# Patient Record
Sex: FEMALE | Race: WHITE | NOT HISPANIC OR LATINO | Employment: FULL TIME | RURAL
[De-identification: names, ages, dates, MRNs, and addresses within clinical notes are randomized per-mention and may not be internally consistent; named-entity substitution may affect disease eponyms.]

---

## 2023-05-01 ENCOUNTER — TELEPHONE (OUTPATIENT)
Dept: FAMILY MEDICINE CLINIC | Facility: CLINIC | Age: 43
End: 2023-05-01

## 2023-05-02 RX ORDER — ESCITALOPRAM OXALATE 10 MG/1
10 TABLET ORAL DAILY
Qty: 30 TABLET | Refills: 0 | Status: SHIPPED | OUTPATIENT
Start: 2023-05-02 | End: 2023-05-25 | Stop reason: SDUPTHER

## 2023-05-02 RX ORDER — ESCITALOPRAM OXALATE 10 MG/1
TABLET ORAL
COMMUNITY
Start: 2023-01-22 | End: 2023-05-02 | Stop reason: SDUPTHER

## 2023-05-17 ENCOUNTER — OFFICE VISIT (OUTPATIENT)
Dept: OBSTETRICS AND GYNECOLOGY | Facility: CLINIC | Age: 43
End: 2023-05-17
Payer: COMMERCIAL

## 2023-05-17 VITALS
BODY MASS INDEX: 31.8 KG/M2 | SYSTOLIC BLOOD PRESSURE: 110 MMHG | WEIGHT: 209.8 LBS | HEIGHT: 68 IN | DIASTOLIC BLOOD PRESSURE: 80 MMHG

## 2023-05-17 DIAGNOSIS — N76.0 ACUTE VAGINITIS: ICD-10-CM

## 2023-05-17 DIAGNOSIS — N60.19 FIBROCYSTIC BREAST DISEASE (FCBD), UNSPECIFIED LATERALITY: ICD-10-CM

## 2023-05-17 DIAGNOSIS — R92.2 BREAST DENSITY: ICD-10-CM

## 2023-05-17 DIAGNOSIS — Z01.411 ENCOUNTER FOR GYNECOLOGICAL EXAMINATION WITH ABNORMAL FINDING: Primary | ICD-10-CM

## 2023-05-17 RX ORDER — MULTIPLE VITAMINS W/ MINERALS TAB 9MG-400MCG
1 TAB ORAL DAILY
COMMUNITY

## 2023-05-17 NOTE — PROGRESS NOTES
"Chief Complaint  Martha Suarez is a 43 y.o.  female presenting for Gynecologic Exam (Former  patient.  Annual exam.  )    History of Present Illness  Martha is a very pleasant 44yo woman, , a delightful pt of mine, previously at [facility].  She has had BTL.  Her menses are still normal and regular monthly.  She has no gyn c/o's.  She is due for pap smear & mammogram.  She does have breast density noted on previous mammograms and has been getting the contrast enhanced mammograms.  (Last in )  She is good about doing reg SBE.  She also noted the FBD changes in right breast, and agrees with me that it seems worse in right breast than left.  Otherwise, ROS neg.    The following portions of the patient's history were reviewed and updated as appropriate: allergies, current medications, past family history, past medical history, past social history, past surgical history and problem list.    Allergies   Allergen Reactions   • Codeine Rash and Swelling         Current Outpatient Medications:   •  escitalopram (LEXAPRO) 10 MG tablet, Take 1 tablet by mouth Daily., Disp: 30 tablet, Rfl: 0  •  multivitamin with minerals (MULTIVITAMIN ADULT PO), Take 1 tablet by mouth Daily., Disp: , Rfl:     Past Medical History:   Diagnosis Date   • Abnormal Pap smear of cervix    • Anxiety         Past Surgical History:   Procedure Laterality Date   • GASTRIC SLEEVE LAPAROSCOPIC     • TUBAL ABDOMINAL LIGATION         Objective  /80   Ht 172.7 cm (68\")   Wt 95.2 kg (209 lb 12.8 oz)   LMP 2023 (Exact Date)   Breastfeeding No   BMI 31.90 kg/m²     Physical Exam  Vitals and nursing note reviewed. Exam conducted with a chaperone present.   Constitutional:       General: She is not in acute distress.     Appearance: Normal appearance. She is not ill-appearing.   HENT:      Head: Normocephalic.   Neck:      Thyroid: No thyroid mass or thyromegaly.   Cardiovascular:      Rate and Rhythm: Normal rate and " regular rhythm.      Heart sounds: Normal heart sounds. No murmur heard.  Pulmonary:      Effort: Pulmonary effort is normal. No respiratory distress.      Breath sounds: Normal breath sounds.   Chest:   Breasts:     Right: No inverted nipple, mass or nipple discharge.      Left: No inverted nipple, mass or nipple discharge.      Comments: Right breast with much more FBD changes (overall density with scatted nodularity in the UOQ); Left breast with small amt FBD in UOQ.  Abdominal:      Palpations: Abdomen is soft. There is no mass.      Tenderness: There is no abdominal tenderness.   Genitourinary:     General: Normal vulva.      Labia:         Right: No rash, tenderness or lesion.         Left: No rash, tenderness or lesion.       Vagina: Vaginal discharge and erythema present.      Cervix: No discharge, lesion or erythema.      Uterus: Not enlarged and not tender.       Adnexa:         Right: No mass or tenderness.          Left: No mass or tenderness.        Comments: Minimal erythema of mucosa, but mod to large amt of rather thin yellowish vaginal discharge.  (OneSwab taken).  Otherwise, exam wnl.  Anus appears wnl.  No rectal exam performed.  Lymphadenopathy:      Upper Body:      Right upper body: No supraclavicular or axillary adenopathy.      Left upper body: No supraclavicular or axillary adenopathy.   Skin:     General: Skin is warm and dry.   Neurological:      Mental Status: She is alert and oriented to person, place, and time.   Psychiatric:         Mood and Affect: Mood normal.         Behavior: Behavior normal.         Assessment/Plan   Diagnoses and all orders for this visit:    1. Encounter for gynecological examination with abnormal finding (Primary)  -     LIQUID-BASED PAP SMEAR WITH HPV GENOTYPING REGARDLESS OF INTERPRETATION (ROSALINO,COR,MAD)    2. Fibrocystic breast disease (FCBD), unspecified laterality    3. Breast density    4. Acute vaginitis  -     OneSwab - Swab, Cervix, Vagina;  Future    MATT & Right Breast US of the UOQ (Cont. At [facility])  Call pt with results of OneSwab & treat then accordingly.      Procedures    40 to 64: Counseling/Anticipatory Guidance Discussed: nutrition, physical activity, screenings and self-breast exam    Return in about 3 months (around 8/17/2023) for Recheck -- follow up clinical breast exam.    Consuelo Prasad, APRN  05/17/2023

## 2023-05-18 LAB — REF LAB TEST METHOD: NORMAL

## 2023-05-19 ENCOUNTER — PATIENT ROUNDING (BHMG ONLY) (OUTPATIENT)
Dept: OBSTETRICS AND GYNECOLOGY | Facility: CLINIC | Age: 43
End: 2023-05-19
Payer: COMMERCIAL

## 2023-05-19 NOTE — PROGRESS NOTES
A [facility] message has been sent to the patient for PATIENT ROUNDING with [facility].

## 2023-05-23 RX ORDER — METRONIDAZOLE 500 MG/1
500 TABLET ORAL 2 TIMES DAILY
Qty: 14 TABLET | Refills: 0 | Status: SHIPPED | OUTPATIENT
Start: 2023-05-23 | End: 2023-05-30

## 2023-05-23 RX ORDER — CLINDAMYCIN PHOSPHATE 20 MG/G
1 CREAM VAGINAL NIGHTLY
Qty: 7 G | Refills: 0 | Status: SHIPPED | OUTPATIENT
Start: 2023-05-23 | End: 2023-05-30

## 2023-05-25 ENCOUNTER — TELEMEDICINE (OUTPATIENT)
Dept: FAMILY MEDICINE CLINIC | Facility: CLINIC | Age: 43
End: 2023-05-25
Payer: COMMERCIAL

## 2023-05-25 DIAGNOSIS — F41.9 ANXIETY: Primary | ICD-10-CM

## 2023-05-25 RX ORDER — ESCITALOPRAM OXALATE 10 MG/1
10 TABLET ORAL DAILY
Qty: 90 TABLET | Refills: 3 | Status: SHIPPED | OUTPATIENT
Start: 2023-05-25

## 2023-05-25 NOTE — PROGRESS NOTES
Mode of Visit: Video  Location of patient: home  You have chosen to receive care through a telehealth visit.  The patient has signed the video visit consent form.  The visit included audio and video interaction. No technical issues occurred during this visit.     Chief Complaint  No chief complaint on file.      Martha Suarez presents to [facility]      Patient seen for refill on her anxiety medication.  Currently takes Lexapro.  Overall condition stable.  No side effects.  She does need refills today.    Review of Systems   Constitutional: Negative for fatigue and fever.   HENT: Negative for congestion and ear pain.    Respiratory: Negative for apnea, cough, chest tightness and shortness of breath.    Cardiovascular: Negative for chest pain.   Gastrointestinal: Negative for abdominal pain, constipation, diarrhea and nausea.   Musculoskeletal: Negative for arthralgias.   Psychiatric/Behavioral: Negative for depressed mood and stress.       Objective   Vital Signs:   There were no vitals taken for this visit.    Physical Exam   Constitutional: She appears well-developed and well-nourished.   Psychiatric: She has a normal mood and affect.       BMI is >= 30 and <35. (Class 1 Obesity). The following options were offered after discussion;: exercise counseling/recommendations and nutrition counseling/recommendations            Assessment and Plan    Diagnoses and all orders for this visit:    1. Anxiety (Primary)    Other orders  -     escitalopram (LEXAPRO) 10 MG tablet; Take 1 tablet by mouth Daily.  Dispense: 90 tablet; Refill: 3        Will refill Lexapro.    Follow Up   No follow-ups on file.  Patient was given instructions and counseling regarding her condition or for health maintenance advice. Please see specific information pulled into the AVS if appropriate.

## 2023-05-30 RX ORDER — ESCITALOPRAM OXALATE 10 MG/1
TABLET ORAL
Qty: 30 TABLET | OUTPATIENT
Start: 2023-05-30

## 2023-08-25 ENCOUNTER — OFFICE VISIT (OUTPATIENT)
Dept: OBSTETRICS AND GYNECOLOGY | Facility: CLINIC | Age: 43
End: 2023-08-25
Payer: COMMERCIAL

## 2023-08-25 VITALS
SYSTOLIC BLOOD PRESSURE: 116 MMHG | BODY MASS INDEX: 32.8 KG/M2 | RESPIRATION RATE: 14 BRPM | WEIGHT: 216.4 LBS | HEIGHT: 68 IN | DIASTOLIC BLOOD PRESSURE: 68 MMHG

## 2023-08-25 DIAGNOSIS — N60.19 FIBROCYSTIC BREAST DISEASE (FCBD), UNSPECIFIED LATERALITY: Primary | ICD-10-CM

## 2023-08-25 DIAGNOSIS — D24.1 FIBROADENOMA OF RIGHT BREAST: ICD-10-CM

## 2023-08-25 DIAGNOSIS — R92.2 BREAST DENSITY: ICD-10-CM

## 2023-08-25 NOTE — PROGRESS NOTES
"Chief Complaint  Martha Suarez is a 43 y.o.  female presenting for Follow-up (3 mos breast check, no complaints)    History of Present Illness  Martha is a very pleasant 44yo woman, , here for 3 mo clinical breast exam (to ensure stability).  She has FBD and extremely dense breasts on mammography.  She underwent an US guided Bx in 2023; pathology: fibroadenoma.  May now return to annual mammograms.  She is good about doing SBE faithfully q month.    The vaginitis we treated at [facility] has resolved, and she hasn't had any recurrence.    Otherwise, ROS negative.      The following portions of the patient's history were reviewed and updated as appropriate: allergies, current medications, past family history, past medical history, past social history, past surgical history, and problem list.    Allergies   Allergen Reactions    Codeine Rash and Swelling         Current Outpatient Medications:     escitalopram (LEXAPRO) 10 MG tablet, Take 1 tablet by mouth Daily., Disp: 90 tablet, Rfl: 3    multivitamin with minerals tablet tablet, Take 1 tablet by mouth Daily., Disp: , Rfl:     Past Medical History:   Diagnosis Date    Abnormal Pap smear of cervix     Anxiety         Past Surgical History:   Procedure Laterality Date    GASTRIC SLEEVE LAPAROSCOPIC      MAMMO US BREAST BIOPSY 1ST W WO DEVICE UNILATERAL Right 2023    path - fibroadenoma    TUBAL ABDOMINAL LIGATION         Objective  /68 (BP Location: Right arm, Patient Position: Sitting, Cuff Size: Adult)   Resp 14   Ht 172.7 cm (68\")   Wt 98.2 kg (216 lb 6.4 oz)   LMP 08/15/2023 (Approximate)   Breastfeeding No   BMI 32.90 kg/mý     Physical Exam  Vitals and nursing note reviewed.   Constitutional:       Appearance: Normal appearance.   Chest:   Breasts:     Right: No inverted nipple, nipple discharge or skin change.      Left: No inverted nipple, nipple discharge or skin change.      Comments: Stable exam with fibrous and cystic changes " bilat.  Dense breasts with lots of scattered nodularity.  The right breast still has more of the fibrous/ dense tissue and scattered nodularity in the UOQ than the left breast.    Lymphadenopathy:      Upper Body:      Right upper body: No supraclavicular or axillary adenopathy.      Left upper body: No supraclavicular or axillary adenopathy.   Neurological:      Mental Status: She is alert.       Assessment/Plan   Diagnoses and all orders for this visit:    1. Fibrocystic breast disease (FCBD), unspecified laterality (Primary)    2. Breast density    3. Fibroadenoma of right breast    Couns re: SBE & continuing annual mammograms.  If lifetime risk ever > 20%, will add MRI annually.  Adv to promptly call us with new findings or anything that is worrisome on exam.    Procedures    40 to 64: Counseling/Anticipatory Guidance Discussed: screenings and self-breast exam    Return for Annual physical.    Consuelo Prasad, APRN  08/25/2023

## 2024-06-18 RX ORDER — ESCITALOPRAM OXALATE 10 MG/1
10 TABLET ORAL DAILY
Qty: 90 TABLET | Refills: 0 | Status: SHIPPED | OUTPATIENT
Start: 2024-06-18

## 2024-08-28 ENCOUNTER — OFFICE VISIT (OUTPATIENT)
Dept: OBSTETRICS AND GYNECOLOGY | Facility: CLINIC | Age: 44
End: 2024-08-28
Payer: COMMERCIAL

## 2024-08-28 ENCOUNTER — LAB (OUTPATIENT)
Dept: LAB | Facility: HOSPITAL | Age: 44
End: 2024-08-28
Payer: COMMERCIAL

## 2024-08-28 VITALS
WEIGHT: 219.6 LBS | HEIGHT: 68 IN | DIASTOLIC BLOOD PRESSURE: 72 MMHG | BODY MASS INDEX: 33.28 KG/M2 | SYSTOLIC BLOOD PRESSURE: 118 MMHG

## 2024-08-28 DIAGNOSIS — Z01.411 ENCOUNTER FOR GYNECOLOGICAL EXAMINATION WITH ABNORMAL FINDING: Primary | ICD-10-CM

## 2024-08-28 DIAGNOSIS — R10.2 PELVIC PAIN: ICD-10-CM

## 2024-08-28 DIAGNOSIS — B35.4 TINEA CORPORIS: ICD-10-CM

## 2024-08-28 DIAGNOSIS — N91.2 AMENORRHEA: ICD-10-CM

## 2024-08-28 DIAGNOSIS — N89.8 VAGINAL DISCHARGE: ICD-10-CM

## 2024-08-28 LAB
ESTRADIOL SERPL HS-MCNC: 123 PG/ML
FSH SERPL-ACNC: 3.41 MIU/ML
LH SERPL-ACNC: 1.7 MIU/ML
TSH SERPL DL<=0.05 MIU/L-ACNC: 2.83 UIU/ML (ref 0.27–4.2)

## 2024-08-28 PROCEDURE — 82670 ASSAY OF TOTAL ESTRADIOL: CPT

## 2024-08-28 PROCEDURE — 84443 ASSAY THYROID STIM HORMONE: CPT

## 2024-08-28 PROCEDURE — 84702 CHORIONIC GONADOTROPIN TEST: CPT

## 2024-08-28 PROCEDURE — 36415 COLL VENOUS BLD VENIPUNCTURE: CPT

## 2024-08-28 PROCEDURE — 83002 ASSAY OF GONADOTROPIN (LH): CPT

## 2024-08-28 PROCEDURE — 83001 ASSAY OF GONADOTROPIN (FSH): CPT

## 2024-08-28 RX ORDER — NYSTATIN 100000 U/G
1 OINTMENT TOPICAL 2 TIMES DAILY
Qty: 30 G | Refills: 3 | Status: SHIPPED | OUTPATIENT
Start: 2024-08-28

## 2024-08-28 NOTE — PROGRESS NOTES
"Chief Complaint  Martha Suarez is a 44 y.o.  female presenting for Annual Exam (Patient with history of ovarian cysts.  Would like to discuss management today. ) and Menstrual Problem (LMP 24.  )    History of Present Illness  Martha is a very pleasant 45yo woman, , here for annual gyn exam.  Her past surgical history includes bilateral tubal sterilization, a right breast bx (fibroadenoma), and gastric sleeve.  She does experience pain at mid-cycle, and we discussed ovulation pain.   It is rel by OTC meds.  Does not interfere with ADL.   She has amenorrhea in August.  No VMS.  She gets contrast enhanced mammograms at [facility] because of the density.  Has a pruritus rash between her breasts.      The following portions of the patient's history were reviewed and updated as appropriate: allergies, current medications, past family history, past medical history, past social history, past surgical history, and problem list.    Allergies   Allergen Reactions    Codeine Rash and Swelling         Current Outpatient Medications:     escitalopram (LEXAPRO) 10 MG tablet, Take 1 tablet by mouth Daily., Disp: 90 tablet, Rfl: 0    multivitamin with minerals tablet tablet, Take 1 tablet by mouth Daily., Disp: , Rfl:     nystatin (MYCOSTATIN) 867813 UNIT/GM ointment, Apply 1 Application topically to the appropriate area as directed 2 (Two) Times a Day., Disp: 30 g, Rfl: 3    Past Medical History:   Diagnosis Date    Abnormal Pap smear of cervix     Anxiety         Past Surgical History:   Procedure Laterality Date    GASTRIC SLEEVE LAPAROSCOPIC      MAMMO US BREAST BIOPSY 1ST W WO DEVICE UNILATERAL Right 2023    path - fibroadenoma    TUBAL ABDOMINAL LIGATION         Objective  /72   Ht 172.7 cm (68\")   Wt 99.6 kg (219 lb 9.6 oz)   LMP 2024 (Exact Date)   Breastfeeding No   BMI 33.39 kg/m²     Physical Exam  Vitals and nursing note reviewed. Exam conducted with a chaperone present. "   Constitutional:       General: She is not in acute distress.     Appearance: Normal appearance. She is not ill-appearing.   HENT:      Head: Normocephalic.   Neck:      Thyroid: No thyroid mass or thyromegaly.   Cardiovascular:      Rate and Rhythm: Normal rate and regular rhythm.      Heart sounds: Normal heart sounds. No murmur heard.  Pulmonary:      Effort: Pulmonary effort is normal. No respiratory distress.      Breath sounds: Normal breath sounds.   Chest:   Breasts:     Right: No inverted nipple, mass or nipple discharge.      Left: No inverted nipple, mass or nipple discharge.   Abdominal:      Palpations: Abdomen is soft. There is no mass.      Tenderness: There is no abdominal tenderness.   Genitourinary:     General: Normal vulva.      Labia:         Right: No rash, tenderness or lesion.         Left: No rash, tenderness or lesion.       Vagina: Normal. Vaginal discharge present. No erythema.      Cervix: No discharge, lesion or erythema.      Uterus: Not enlarged and not tender.       Adnexa:         Right: No mass or tenderness.          Left: No mass or tenderness.        Comments: Anus appears wnl.  No rectal exam performed.  Lymphadenopathy:      Upper Body:      Right upper body: No supraclavicular or axillary adenopathy.      Left upper body: No supraclavicular or axillary adenopathy.   Skin:     General: Skin is warm and dry.      Findings: Rash present.      Comments: Fine maculopapular erythematous rash between breasts (tinea appearance).   Neurological:      Mental Status: She is alert and oriented to person, place, and time.   Psychiatric:         Mood and Affect: Mood normal.         Behavior: Behavior normal.         Assessment/Plan   Diagnoses and all orders for this visit:    1. Encounter for gynecological examination with abnormal finding (Primary)    2. Pelvic pain  -     US Non-ob Transvaginal; Future  -     Follicle Stimulating Hormone; Future  -     Luteinizing Hormone; Future  -      Estradiol; Future  -     TSH; Future  -     HCG, B-subunit, Quantitative; Future    3. Amenorrhea  -     US Non-ob Transvaginal; Future  -     Follicle Stimulating Hormone; Future  -     Luteinizing Hormone; Future  -     Estradiol; Future  -     TSH; Future  -     HCG, B-subunit, Quantitative; Future    4. Tinea corporis  -     nystatin (MYCOSTATIN) [id number] UNIT/GM ointment; Apply 1 Application topically to the appropriate area as directed 2 (Two) Times a Day.  Dispense: 30 g; Refill: 3    5. Vaginal discharge  -     OneSwab - Swab, Vagina; Future    We discussed mittelschmerz & OTC meds.  Will rule out anomalies in the pelvis, but normal bimanual exam.    This dear pt was informed of my plans for nursing home in April 2025.  She will see Dr. Matthews in the future.    Procedures    40 to 64: Counseling/Anticipatory Guidance Discussed: screenings and self-breast exam    Return in about 1 year (around 8/28/2025) for Annual physical.    Consuelo Prasad, APRTAY  08/28/2024

## 2024-08-29 LAB — HCG INTACT+B SERPL-ACNC: <1 MIU/ML

## 2024-08-30 RX ORDER — MEDROXYPROGESTERONE ACETATE 10 MG
10 TABLET ORAL DAILY
Qty: 10 TABLET | Refills: 0 | Status: SHIPPED | OUTPATIENT
Start: 2024-08-30 | End: 2024-09-09

## 2024-09-03 ENCOUNTER — OFFICE VISIT (OUTPATIENT)
Dept: FAMILY MEDICINE CLINIC | Facility: CLINIC | Age: 44
End: 2024-09-03
Payer: COMMERCIAL

## 2024-09-03 VITALS
BODY MASS INDEX: 33.04 KG/M2 | DIASTOLIC BLOOD PRESSURE: 60 MMHG | SYSTOLIC BLOOD PRESSURE: 110 MMHG | WEIGHT: 218 LBS | HEART RATE: 76 BPM | OXYGEN SATURATION: 98 % | HEIGHT: 68 IN

## 2024-09-03 DIAGNOSIS — Z00.00 ROUTINE GENERAL MEDICAL EXAMINATION AT A HEALTH CARE FACILITY: Primary | ICD-10-CM

## 2024-09-03 PROCEDURE — 99396 PREV VISIT EST AGE 40-64: CPT | Performed by: FAMILY MEDICINE

## 2024-09-03 RX ORDER — METRONIDAZOLE 500 MG/1
500 TABLET ORAL 2 TIMES DAILY
Qty: 14 TABLET | Refills: 0 | Status: SHIPPED | OUTPATIENT
Start: 2024-09-03 | End: 2024-09-10

## 2024-09-03 RX ORDER — ESCITALOPRAM OXALATE 10 MG/1
10 TABLET ORAL DAILY
Qty: 90 TABLET | Refills: 3 | Status: SHIPPED | OUTPATIENT
Start: 2024-09-03

## 2024-09-03 NOTE — PROGRESS NOTES
Female Physical Note      Date: 2024   Patient Name: Martha Suarez  : 1980   MRN: [id number]     Chief Complaint:    Chief Complaint   Patient presents with    Annual Exam       History of Present Illness: Martha Suarez is a 44 y.o. female who is here today for their annual health maintenance and physical.   History of Present Illness  The patient is a 44-year-old female here for her annual physical.    She reports overall good health. Last week, she underwent hormonal and thyroid tests at [facility].    She has been prescribed Lexapro, which is due for a refill in 2024.    A mammogram has been ordered for her this year, but she has yet to schedule the appointment.    She expresses interest in Ozempic, a medication used by her  for diabetes management. She has a diagnosis of Polycystic Ovary Syndrome (PCOS) and has experienced weight gain even with maintaining a healthy diet and regular exercise. She is considering Ozempic as a potential treatment option.    FAMILY HISTORY  Her mother has some cognitive decline. Her father is diabetic.    Subjective      Review of Systems:   Review of Systems   Constitutional:  Negative for fatigue and fever.   HENT:  Negative for congestion and ear pain.    Respiratory:  Negative for apnea, cough, chest tightness and shortness of breath.    Cardiovascular:  Negative for chest pain.   Gastrointestinal:  Negative for abdominal pain, constipation, diarrhea and nausea.   Musculoskeletal:  Negative for arthralgias.   Psychiatric/Behavioral:  Negative for depressed mood and stress.        Past Medical History:   Past Medical History:   Diagnosis Date    Abnormal Pap smear of cervix     Anxiety        Past Surgical History:   Past Surgical History:   Procedure Laterality Date    GASTRIC SLEEVE LAPAROSCOPIC      MAMMO US BREAST BIOPSY 1ST W WO DEVICE UNILATERAL Right 2023    path - fibroadenoma    TUBAL ABDOMINAL LIGATION         Family History:    Family History   Problem Relation Age of Onset    Coronary artery disease Father     Diabetes Father     Leukemia Father     Heart attack Father     Diabetes Mother     Diabetes Maternal Grandfather     Diabetes Maternal Aunt     Parkinsonism Maternal Aunt     Diabetes Maternal Aunt        Social History:   Social History     Socioeconomic History    Marital status:    Tobacco Use    Smoking status: Former     Types: Cigarettes    Smokeless tobacco: Never   Vaping Use    Vaping status: Never Used   Substance and Sexual Activity    Alcohol use: Yes     Alcohol/week: 2.0 - 3.0 standard drinks of alcohol     Types: 2 - 3 Drinks containing 0.5 oz of alcohol per week     Comment: social    Drug use: Never    Sexual activity: Yes     Partners: Male     Birth control/protection: Tubal ligation       Medications:     Current Outpatient Medications:     escitalopram (LEXAPRO) 10 MG tablet, Take 1 tablet by mouth Daily., Disp: 90 tablet, Rfl: 3    medroxyPROGESTERone (Provera) 10 MG tablet, Take 1 tablet by mouth Daily for 10 days., Disp: 10 tablet, Rfl: 0    multivitamin with minerals tablet tablet, Take 1 tablet by mouth Daily., Disp: , Rfl:     nystatin (MYCOSTATIN) 603003 UNIT/GM ointment, Apply 1 Application topically to the appropriate area as directed 2 (Two) Times a Day., Disp: 30 g, Rfl: 3    Tirzepatide-Weight Management (ZEPBOUND) 2.5 MG/0.5ML solution auto-injector, Inject 0.5 mL under the skin into the appropriate area as directed 1 (One) Time Per Week., Disp: 2 mL, Rfl: 1    Allergies:   Allergies   Allergen Reactions    Codeine Rash and Swelling       Immunization History   Administered Date(s) Administered    COVID-19 (MODERNA) 1st,2nd,3rd Dose Monovalent 01/26/2021, 02/23/2021    Hepatitis A 09/21/2018, 03/22/2019    Tdap 08/17/2018      Colorectal Screening:     Last Completed Colonoscopy       This patient has no relevant Health Maintenance data.          Pap:    Last Completed Pap Smear        "     PAP SMEAR (Every 3 Years) Next due on 5/17/2026 05/17/2023  LIQUID-BASED PAP SMEAR WITH HPV GENOTYPING REGARDLESS OF INTERPRETATION (ROSALINO,COR,MAD)                   Mammogram:    Last Completed Mammogram            MAMMOGRAM (Every 2 Years) Next due on 6/27/2025 06/27/2023  SCANNED - MAMMO    06/22/2023  SCANNED - MAMMO    06/22/2023  SCANNED - MAMMO    08/24/2021  SCANNED - MAMMO    12/20/2019  SCANNED - MAMMO    Only the first 5 history entries have been loaded, but more history exists.                     Bone Density/DEXA: .     Diet/Physical activity: Appropriate diet and physical activity discussed.    Depression: PHQ-2 Depression Screening  Little interest or pleasure in doing things? 0-->not at all   Feeling down, depressed, or hopeless? 0-->not at all   PHQ-2 Total Score 0       Objective     Physical Exam:  Vital Signs:   Vitals:    09/03/24 0907   BP: 110/60   Pulse: 76   SpO2: 98%   Weight: 98.9 kg (218 lb)   Height: 172.7 cm (68\")     Body mass index is 33.15 kg/m².     Physical Exam  Vitals and nursing note reviewed.   Constitutional:       General: She is not in acute distress.     Appearance: Normal appearance. She is not ill-appearing.   HENT:      Head: Normocephalic and atraumatic.      Right Ear: Tympanic membrane and ear canal normal.      Left Ear: Tympanic membrane and ear canal normal.      Nose: Nose normal.   Cardiovascular:      Rate and Rhythm: Normal rate and regular rhythm.      Heart sounds: Normal heart sounds.   Pulmonary:      Effort: Pulmonary effort is normal.      Breath sounds: Normal breath sounds.   Neurological:      Mental Status: She is alert and oriented to person, place, and time. Mental status is at baseline.   Psychiatric:         Mood and Affect: Mood normal.       Physical Exam  Vital Signs  Blood pressure reading is 110/60.    Procedures  Results  Laboratory Studies  Thyroid test was good.    Assessment / Plan      Assessment/Plan:   Diagnoses and all " orders for this visit:    1. Routine general medical examination at a health care facility (Primary)  -     CBC & Differential  -     Comprehensive Metabolic Panel  -     Lipid Panel    Other orders  -     escitalopram (LEXAPRO) 10 MG tablet; Take 1 tablet by mouth Daily.  Dispense: 90 tablet; Refill: 3  -     Tirzepatide-Weight Management (ZEPBOUND) 2.5 MG/0.5ML solution auto-injector; Inject 0.5 mL under the skin into the appropriate area as directed 1 (One) Time Per Week.  Dispense: 2 mL; Refill: 1       Assessment & Plan  1. Annual Physical.  Her blood pressure is 110/60 mmHg. Thyroid function tests from last week at [facility] are within normal limits. Routine blood work, including complete blood count, kidney function tests, liver function tests, glucose levels, and cholesterol panel, will be ordered. Results will be reviewed through [facility], and any issues will be communicated directly.    2. Medication Management.  Her Lexapro prescription will  in 2024. A refill for Lexapro 10 mg will be provided for 90 days at a time, sent to [facility].    3. Polycystic Ovary Syndrome (PCOS).  Obesity.  She has experienced weight gain despite maintaining a healthy diet and regular exercise. A prescription for Wegovy will be sent to the pharmacy, pending insurance approval. She is advised to contact human resources to understand the insurance process for weight management medications. If approved, she will start at a lower dose and gradually increase it to avoid nausea. She may need to use a fiber supplement or stool softener to manage potential constipation. Follow-up will be done through [facility] to adjust the dosage as needed.    4. Health Maintenance.  She received a tetanus vaccine in  and is not due for another until . She underwent a Pap smear last year and is not due for another until . She is not due for colon cancer screening until age 47. A mammogram has  been ordered and she will schedule the appointment at [facility].          Follow Up:   No follow-ups on file.    Healthcare Maintenance:   Counseling provided on appropriate health maintenance..   Martha Suarez voices understanding and acceptance of this advice and will call back with any further questions or concerns. AVS with preventive healthcare tips printed for patient.     Suresh Brennan MD  [facility]    Patient or patient representative verbalized consent for the use of Ambient Listening during the visit with  Suresh Brennan MD for chart documentation. 9/3/2024  10:55 EDT

## 2024-09-04 LAB
ALBUMIN SERPL-MCNC: 4.3 G/DL (ref 3.9–4.9)
ALP SERPL-CCNC: 67 IU/L (ref 44–121)
ALT SERPL-CCNC: 9 IU/L (ref 0–32)
AST SERPL-CCNC: 17 IU/L (ref 0–40)
BASOPHILS # BLD AUTO: 0 X10E3/UL (ref 0–0.2)
BASOPHILS NFR BLD AUTO: 1 %
BILIRUB SERPL-MCNC: 0.2 MG/DL (ref 0–1.2)
BUN SERPL-MCNC: 11 MG/DL (ref 6–24)
BUN/CREAT SERPL: 11 (ref 9–23)
CALCIUM SERPL-MCNC: 9 MG/DL (ref 8.7–10.2)
CHLORIDE SERPL-SCNC: 103 MMOL/L (ref 96–106)
CHOLEST SERPL-MCNC: 159 MG/DL (ref 100–199)
CO2 SERPL-SCNC: 26 MMOL/L (ref 20–29)
CREAT SERPL-MCNC: 0.99 MG/DL (ref 0.57–1)
EGFRCR SERPLBLD CKD-EPI 2021: 73 ML/MIN/1.73
EOSINOPHIL # BLD AUTO: 0.1 X10E3/UL (ref 0–0.4)
EOSINOPHIL NFR BLD AUTO: 2 %
ERYTHROCYTE [DISTWIDTH] IN BLOOD BY AUTOMATED COUNT: 12.3 % (ref 11.7–15.4)
GLOBULIN SER CALC-MCNC: 2.7 G/DL (ref 1.5–4.5)
GLUCOSE SERPL-MCNC: 87 MG/DL (ref 70–99)
HCT VFR BLD AUTO: 42.1 % (ref 34–46.6)
HDLC SERPL-MCNC: 44 MG/DL
HGB BLD-MCNC: 13.6 G/DL (ref 11.1–15.9)
IMM GRANULOCYTES # BLD AUTO: 0 X10E3/UL (ref 0–0.1)
IMM GRANULOCYTES NFR BLD AUTO: 0 %
LDLC SERPL CALC-MCNC: 100 MG/DL (ref 0–99)
LYMPHOCYTES # BLD AUTO: 1.8 X10E3/UL (ref 0.7–3.1)
LYMPHOCYTES NFR BLD AUTO: 27 %
MCH RBC QN AUTO: 31.8 PG (ref 26.6–33)
MCHC RBC AUTO-ENTMCNC: 32.3 G/DL (ref 31.5–35.7)
MCV RBC AUTO: 98 FL (ref 79–97)
MONOCYTES # BLD AUTO: 0.5 X10E3/UL (ref 0.1–0.9)
MONOCYTES NFR BLD AUTO: 8 %
NEUTROPHILS # BLD AUTO: 4.2 X10E3/UL (ref 1.4–7)
NEUTROPHILS NFR BLD AUTO: 62 %
PLATELET # BLD AUTO: 247 X10E3/UL (ref 150–450)
POTASSIUM SERPL-SCNC: 5 MMOL/L (ref 3.5–5.2)
PROT SERPL-MCNC: 7 G/DL (ref 6–8.5)
RBC # BLD AUTO: 4.28 X10E6/UL (ref 3.77–5.28)
SODIUM SERPL-SCNC: 141 MMOL/L (ref 134–144)
TRIGL SERPL-MCNC: 80 MG/DL (ref 0–149)
VLDLC SERPL CALC-MCNC: 15 MG/DL (ref 5–40)
WBC # BLD AUTO: 6.6 X10E3/UL (ref 3.4–10.8)

## 2024-10-24 ENCOUNTER — TELEPHONE (OUTPATIENT)
Dept: OBSTETRICS AND GYNECOLOGY | Facility: CLINIC | Age: 44
End: 2024-10-24
Payer: COMMERCIAL

## 2024-10-24 NOTE — TELEPHONE ENCOUNTER
[facility] CALLING THEY SENT OVER HER MAMMO AND ARE CALLING FOR AN ORDER :  STEREOTACTIC MAMMOGRAM    WHEN ORDERS ARE PLACED    I WILL FAX -[phone]

## 2024-11-07 ENCOUNTER — PATIENT MESSAGE (OUTPATIENT)
Dept: FAMILY MEDICINE CLINIC | Facility: CLINIC | Age: 44
End: 2024-11-07
Payer: COMMERCIAL

## 2024-11-08 DIAGNOSIS — R92.8 ABNORMAL MAMMOGRAM: Primary | ICD-10-CM

## 2025-05-20 ENCOUNTER — TELEPHONE (OUTPATIENT)
Dept: FAMILY MEDICINE CLINIC | Facility: CLINIC | Age: 45
End: 2025-05-20

## 2025-05-20 NOTE — TELEPHONE ENCOUNTER
Incoming Refill Request      Medication requested (name and dose): [facility]     Pharmacy where request should be sent:   [facility]     Additional details provided by patient: PT STATED THIS MEDICATION NEEDS A PRIOR AUTHORIZATION, SHE'S NOT HAD THIS MEDICATION FOR A MONTH.     Best call back number: [phone]    Does the patient have less than a 3 day supply:  [x] Yes  [] No    Peter Castano Rep  05/20/25, 08:15 EDT

## 2025-05-23 NOTE — TELEPHONE ENCOUNTER
I resent the 10 mg for patient to have for this month.     She would like to start on the dosing equivalent of Wegovy for next month due to insurance.

## 2025-05-23 NOTE — TELEPHONE ENCOUNTER
Okay for prior authorization.  I am not sure I would increase it if she has been without it 1 month.  If it is approved through insurance and probably take it for 1 month at the 10 and then increase it to 12.5.  Looks like her insurance is through the state government.  If I am not mistaken, I do not think they are paying for Zepbound now.  I think they want patients to change to Wegovy.

## 2025-06-11 ENCOUNTER — OFFICE VISIT (OUTPATIENT)
Dept: FAMILY MEDICINE CLINIC | Facility: CLINIC | Age: 45
End: 2025-06-11
Payer: COMMERCIAL

## 2025-06-11 VITALS
DIASTOLIC BLOOD PRESSURE: 58 MMHG | HEART RATE: 72 BPM | WEIGHT: 171 LBS | BODY MASS INDEX: 25.91 KG/M2 | HEIGHT: 68 IN | SYSTOLIC BLOOD PRESSURE: 98 MMHG | OXYGEN SATURATION: 98 %

## 2025-06-11 DIAGNOSIS — E66.01 MORBID (SEVERE) OBESITY DUE TO EXCESS CALORIES: Primary | ICD-10-CM

## 2025-06-11 DIAGNOSIS — F41.9 ANXIETY: ICD-10-CM

## 2025-06-11 DIAGNOSIS — C50.919 MALIGNANT NEOPLASM OF FEMALE BREAST, UNSPECIFIED ESTROGEN RECEPTOR STATUS, UNSPECIFIED LATERALITY, UNSPECIFIED SITE OF BREAST: ICD-10-CM

## 2025-06-11 RX ORDER — ESCITALOPRAM OXALATE 20 MG/1
20 TABLET ORAL DAILY
Qty: 90 TABLET | Refills: 1 | Status: SHIPPED | OUTPATIENT
Start: 2025-06-11

## 2025-06-11 RX ORDER — SEMAGLUTIDE 1 MG/.5ML
1 INJECTION, SOLUTION SUBCUTANEOUS WEEKLY
Qty: 2 ML | Refills: 5 | Status: SHIPPED | OUTPATIENT
Start: 2025-06-11

## 2025-06-11 NOTE — PROGRESS NOTES
Follow Up Office Visit      Date of Visit:  2025   Patient Name: Martha Suarez  : 1980   MRN: [id number]     Chief Complaint:    Chief Complaint   Patient presents with    Weight Check     Needs documentation for Wegovy PA    Anxiety       History of Present Illness: Martha Suarez is a 45 y.o. female who is here today for follow up.    History of Present Illness  The patient presents for evaluation of breast cancer, stress, and weight management.    She was diagnosed with breast cancer in late October or early 2024. She underwent a lumpectomy on 2024, performed by Dr. Sneha Abreu. Subsequently, she received 20 rounds of radiation therapy under the care of Dr. Rosales, her radiation oncologist, from February to the end of 2025. Currently, she is on tamoxifen, which she reports as being challenging to tolerate. Her oncologist, Dr. Paulson, has recommended a 15-year course of tamoxifen due to her age at diagnosis. She has experienced significant hair loss and night sweats since starting tamoxifen. She also reports episodes of low blood pressure during her treatment, with one instance of a reading as low as 70/40. She has a scheduled appointment with Dr. Rosales tomorrow for a 3-month follow-up.    She reports persistent stress despite being on Lexapro, which she feels is no longer effective. She has previously declined dose increases but is now considering this option.    She has lost approximately 50 pounds and was previously on Wegovy, which was discontinued due to insurance issues. She has been on the new medication for 2 weeks without any side effects and is considering increasing the dose to 1 mg next month.    PAST SURGICAL HISTORY:  - Lumpectomy on 2024      Subjective      Review of Systems:   Review of Systems    Past Medical History:   Past Medical History:   Diagnosis Date    Abnormal Pap smear of cervix     Anxiety        Past Surgical History:   Past Surgical  "History:   Procedure Laterality Date    GASTRIC SLEEVE LAPAROSCOPIC      MAMMO US BREAST BIOPSY 1ST W WO DEVICE UNILATERAL Right 06/2023    path - fibroadenoma    TUBAL ABDOMINAL LIGATION         Family History:   Family History   Problem Relation Age of Onset    Coronary artery disease Father     Diabetes Father     Leukemia Father     Heart attack Father     Diabetes Mother     Diabetes Maternal Grandfather     Diabetes Maternal Aunt     Parkinsonism Maternal Aunt     Diabetes Maternal Aunt        Social History:   Social History     Socioeconomic History    Marital status:    Tobacco Use    Smoking status: Former     Types: Cigarettes    Smokeless tobacco: Never   Vaping Use    Vaping status: Never Used   Substance and Sexual Activity    Alcohol use: Yes     Alcohol/week: 2.0 - 3.0 standard drinks of alcohol     Types: 2 - 3 Drinks containing 0.5 oz of alcohol per week     Comment: social    Drug use: Never    Sexual activity: Yes     Partners: Male     Birth control/protection: Tubal ligation       Medications:     Current Outpatient Medications:     escitalopram (LEXAPRO) 20 MG tablet, Take 1 tablet by mouth Daily., Disp: 90 tablet, Rfl: 1    nystatin (MYCOSTATIN) 819501 UNIT/GM ointment, Apply 1 Application topically to the appropriate area as directed 2 (Two) Times a Day., Disp: 30 g, Rfl: 3    multivitamin with minerals tablet tablet, Take 1 tablet by mouth Daily., Disp: , Rfl:     Semaglutide-Weight Management (Wegovy) 1 MG/0.5ML solution auto-injector, Inject 0.5 mL under the skin into the appropriate area as directed 1 (One) Time Per Week., Disp: 2 mL, Rfl: 5    Allergies:   Allergies   Allergen Reactions    Codeine Rash and Swelling       Objective     Physical Exam:  Vital Signs:   Vitals:    06/11/25 1331   BP: 98/58   Pulse: 72   SpO2: 98%   Weight: 77.6 kg (171 lb)   Height: 172.7 cm (68\")     Body mass index is 26 kg/m².     Physical Exam  Vitals and nursing note reviewed.   Constitutional:  "      General: She is not in acute distress.     Appearance: Normal appearance. She is not ill-appearing.   HENT:      Head: Normocephalic and atraumatic.      Right Ear: Tympanic membrane and ear canal normal.      Left Ear: Tympanic membrane and ear canal normal.      Nose: Nose normal.   Cardiovascular:      Rate and Rhythm: Normal rate and regular rhythm.      Heart sounds: Normal heart sounds.   Pulmonary:      Effort: Pulmonary effort is normal.      Breath sounds: Normal breath sounds.   Neurological:      Mental Status: She is alert and oriented to person, place, and time. Mental status is at baseline.   Psychiatric:         Mood and Affect: Mood normal.       Physical Exam  Cardiovascular: Heart rate normal    Procedures    Results  Labs   - Blood counts: Normal, not anemic   - Kidney function: Normal   - Liver function: Normal   - Blood sugar: Normal   - Electrolytes: Normal  Assessment / Plan      Assessment/Plan:   Diagnoses and all orders for this visit:    1. Morbid (severe) obesity due to excess calories (Primary)    Other orders  -     Semaglutide-Weight Management (Wegovy) 1 MG/0.5ML solution auto-injector; Inject 0.5 mL under the skin into the appropriate area as directed 1 (One) Time Per Week.  Dispense: 2 mL; Refill: 5  -     escitalopram (LEXAPRO) 20 MG tablet; Take 1 tablet by mouth Daily.  Dispense: 90 tablet; Refill: 1       Assessment & Plan  1. Breast cancer.  - Diagnosed in late October or early November and underwent lumpectomy on 12/13/2024.  - Completed 20 rounds of radiation therapy in February and March.  - Currently on tamoxifen, experiencing side effects such as hair loss and night sweats.  - Importance of continuing tamoxifen for 15 years due to age at diagnosis discussed; increasing Lexapro dosage to 20 mg daily recommended to manage stress and alleviate menopausal symptoms caused by tamoxifen. If Lexapro is insufficient, clonidine at the smallest dose at night may be considered  for hot flashes.    2. Stress.  - Current dose of Lexapro is no longer effective in managing stress.  - Increasing Lexapro dosage to 20 mg daily recommended; advised to take two 10 mg tablets if any left before switching to 20 mg tablets.  - Suggested spending time outdoors in the sun for 20 minutes daily without sunscreen to help manage stress.    3. Weight management.  - Lost approximately 50 pounds and currently on Wegovy.  - Started on a lower dose to avoid side effects and has tolerated it well.  - Dosage of Wegovy will be increased to 1 mg next month; refills for the higher dose sent to pharmacy. If she decides to increase the dose further, she will notify the office.    4. Health maintenance.  - Last blood work conducted in 09/2024 showed normal results.  - Not due for colon cancer screening for another 2 years.        Follow Up:   No follow-ups on file.    Suresh Brennan  [facility] [place]     Patient or patient representative verbalized consent for the use of Ambient Listening during the visit with  Suresh Brennan MD for chart documentation. 6/11/2025  19:46 EDT

## 2025-08-08 RX ORDER — ESCITALOPRAM OXALATE 20 MG/1
20 TABLET ORAL DAILY
Qty: 90 TABLET | Refills: 0 | Status: SHIPPED | OUTPATIENT
Start: 2025-08-08

## 2025-08-08 RX ORDER — SEMAGLUTIDE 1 MG/.5ML
1 INJECTION, SOLUTION SUBCUTANEOUS WEEKLY
Qty: 2 ML | Refills: 0 | Status: SHIPPED | OUTPATIENT
Start: 2025-08-08

## 2025-08-20 ENCOUNTER — OFFICE VISIT (OUTPATIENT)
Dept: FAMILY MEDICINE CLINIC | Facility: CLINIC | Age: 45
End: 2025-08-20
Payer: COMMERCIAL

## 2025-08-20 VITALS
HEIGHT: 68 IN | OXYGEN SATURATION: 98 % | HEART RATE: 71 BPM | DIASTOLIC BLOOD PRESSURE: 68 MMHG | SYSTOLIC BLOOD PRESSURE: 116 MMHG | BODY MASS INDEX: 26.98 KG/M2 | WEIGHT: 178 LBS

## 2025-08-20 DIAGNOSIS — E56.9 VITAMIN DEFICIENCY: ICD-10-CM

## 2025-08-20 DIAGNOSIS — Z13.220 SCREENING FOR LIPID DISORDERS: ICD-10-CM

## 2025-08-20 DIAGNOSIS — F41.1 GENERALIZED ANXIETY DISORDER: ICD-10-CM

## 2025-08-20 DIAGNOSIS — Z71.3 DIETARY COUNSELING AND SURVEILLANCE: ICD-10-CM

## 2025-08-20 DIAGNOSIS — Z13.1 SCREENING FOR DIABETES MELLITUS: ICD-10-CM

## 2025-08-20 DIAGNOSIS — Z00.00 ROUTINE MEDICAL EXAM: Primary | ICD-10-CM

## 2025-08-20 RX ORDER — SEMAGLUTIDE 0.5 MG/.5ML
0.5 INJECTION, SOLUTION SUBCUTANEOUS WEEKLY
Qty: 2 ML | Refills: 0 | Status: SHIPPED | OUTPATIENT
Start: 2025-08-20

## 2025-08-20 RX ORDER — ESCITALOPRAM OXALATE 20 MG/1
20 TABLET ORAL DAILY
Qty: 90 TABLET | Refills: 1 | Status: SHIPPED | OUTPATIENT
Start: 2025-08-20

## 2025-08-22 ENCOUNTER — TELEPHONE (OUTPATIENT)
Dept: FAMILY MEDICINE CLINIC | Facility: CLINIC | Age: 45
End: 2025-08-22
Payer: COMMERCIAL

## 2025-08-22 ENCOUNTER — LAB (OUTPATIENT)
Dept: FAMILY MEDICINE CLINIC | Facility: CLINIC | Age: 45
End: 2025-08-22
Payer: COMMERCIAL

## 2025-08-23 LAB
25(OH)D3+25(OH)D2 SERPL-MCNC: 39.5 NG/ML (ref 30–100)
ALBUMIN SERPL-MCNC: 4.1 G/DL (ref 3.9–4.9)
ALP SERPL-CCNC: 49 IU/L (ref 44–121)
ALT SERPL-CCNC: 6 IU/L (ref 0–32)
AST SERPL-CCNC: 13 IU/L (ref 0–40)
BASOPHILS # BLD AUTO: 0 X10E3/UL (ref 0–0.2)
BASOPHILS NFR BLD AUTO: 1 %
BILIRUB SERPL-MCNC: 0.4 MG/DL (ref 0–1.2)
BUN SERPL-MCNC: 13 MG/DL (ref 6–24)
BUN/CREAT SERPL: 13 (ref 9–23)
CALCIUM SERPL-MCNC: 9 MG/DL (ref 8.7–10.2)
CHLORIDE SERPL-SCNC: 102 MMOL/L (ref 96–106)
CHOLEST SERPL-MCNC: 121 MG/DL (ref 100–199)
CO2 SERPL-SCNC: 23 MMOL/L (ref 20–29)
CREAT SERPL-MCNC: 0.98 MG/DL (ref 0.57–1)
EGFRCR SERPLBLD CKD-EPI 2021: 73 ML/MIN/1.73
EOSINOPHIL # BLD AUTO: 0.1 X10E3/UL (ref 0–0.4)
EOSINOPHIL NFR BLD AUTO: 2 %
ERYTHROCYTE [DISTWIDTH] IN BLOOD BY AUTOMATED COUNT: 12.3 % (ref 11.7–15.4)
FOLATE SERPL-MCNC: 8.8 NG/ML
GLOBULIN SER CALC-MCNC: 2.6 G/DL (ref 1.5–4.5)
GLUCOSE SERPL-MCNC: 82 MG/DL (ref 70–99)
HBA1C MFR BLD: 5.2 % (ref 4.8–5.6)
HCT VFR BLD AUTO: 39.8 % (ref 34–46.6)
HDLC SERPL-MCNC: 45 MG/DL
HGB BLD-MCNC: 12.8 G/DL (ref 11.1–15.9)
IMM GRANULOCYTES # BLD AUTO: 0 X10E3/UL (ref 0–0.1)
IMM GRANULOCYTES NFR BLD AUTO: 0 %
LDLC SERPL CALC-MCNC: 61 MG/DL (ref 0–99)
LYMPHOCYTES # BLD AUTO: 1.7 X10E3/UL (ref 0.7–3.1)
LYMPHOCYTES NFR BLD AUTO: 28 %
MCH RBC QN AUTO: 31.5 PG (ref 26.6–33)
MCHC RBC AUTO-ENTMCNC: 32.2 G/DL (ref 31.5–35.7)
MCV RBC AUTO: 98 FL (ref 79–97)
MONOCYTES # BLD AUTO: 0.4 X10E3/UL (ref 0.1–0.9)
MONOCYTES NFR BLD AUTO: 7 %
NEUTROPHILS # BLD AUTO: 3.7 X10E3/UL (ref 1.4–7)
NEUTROPHILS NFR BLD AUTO: 62 %
PLATELET # BLD AUTO: 172 X10E3/UL (ref 150–450)
POTASSIUM SERPL-SCNC: 4.6 MMOL/L (ref 3.5–5.2)
PROT SERPL-MCNC: 6.7 G/DL (ref 6–8.5)
RBC # BLD AUTO: 4.06 X10E6/UL (ref 3.77–5.28)
SODIUM SERPL-SCNC: 139 MMOL/L (ref 134–144)
TRIGL SERPL-MCNC: 70 MG/DL (ref 0–149)
TSH SERPL DL<=0.005 MIU/L-ACNC: 2.72 UIU/ML (ref 0.45–4.5)
VIT B12 SERPL-MCNC: 633 PG/ML (ref 232–1245)
VLDLC SERPL CALC-MCNC: 15 MG/DL (ref 5–40)
WBC # BLD AUTO: 5.9 X10E3/UL (ref 3.4–10.8)